# Patient Record
Sex: FEMALE | Race: WHITE
[De-identification: names, ages, dates, MRNs, and addresses within clinical notes are randomized per-mention and may not be internally consistent; named-entity substitution may affect disease eponyms.]

---

## 2019-09-06 ENCOUNTER — HOSPITAL ENCOUNTER (EMERGENCY)
Dept: HOSPITAL 25 - UCCORT | Age: 79
Discharge: HOME | End: 2019-09-06
Payer: MEDICARE

## 2019-09-06 VITALS — SYSTOLIC BLOOD PRESSURE: 129 MMHG | DIASTOLIC BLOOD PRESSURE: 74 MMHG
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
## 2019-09-06 DIAGNOSIS — I10: ICD-10-CM

## 2019-09-06 DIAGNOSIS — E11.9: ICD-10-CM

## 2019-09-06 DIAGNOSIS — L03.115: Primary | ICD-10-CM

## 2019-09-06 DIAGNOSIS — S92.354A: ICD-10-CM
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
## 2019-09-06 PROCEDURE — G0463 HOSPITAL OUTPT CLINIC VISIT: HCPCS

## 2019-09-06 PROCEDURE — 99212 OFFICE O/P EST SF 10 MIN: CPT

## 2019-09-06 NOTE — XMS REPORT
Continuity of Care Document (CCD)

 Created on:2019



Patient:Yumiko Norton

Sex:Female

:1940

External Reference #:[id number]





Demographics







 Address  [address] [place] [address]

 

 Home Phone  [phone]

 

 Email Address  [email or web address]

 

 Preferred Language  en

 

 Marital Status  Not  or 

 

 Quaker Affiliation  Unknown

 

 Race  White

 

 Ethnic Group  Not  or 









Author







 Name  Anselmo Nation MD (transmitted by agent of provider Aman Sousa)

 

 Address  [address] [place] [id number]









Care Team Providers







 Name  Role  Phone

 

 Caden Chandra DPM - Podiatrist  Care Team Information   [phone]
[phone]

 

 Jaime Copeland RPA - Physician  Care Team Information   [phone]
[phone]



 Assistant    









Problems







 Description

 

 No Information Available







Social History







 Type  Date  Description  Comments

 

 Birth Sex    Unknown  

 

 ETOH Use    Denies alcohol use  

 

 Tobacco Use  Start: Unknown  Patient has never smoked  

 

 Recreational Drug Use    Denies Drug Use  

 

 Exercise Type/Frequency    Does not exercise  







Allergies, Adverse Reactions, Alerts







 Description

 

 No Known Drug Allergies







Medications







 Active Medications  SIG  Qnty  Indications  Ordering  Date



         Provider  

 

 Metoprolol Tartrate  1 tab by mouth      Unknown  



             50mg Tablets  once daily        



           

 

 Lantus Solostar  134 units at hs      Unknown  



         100Unit/ML Solution          



 Pen-Inject          

 

 Glyburide  1 tab by mouth      Unknown  



   5mg Tablets  once daily        



           

 

 Lyrica  1 tab by mouth      Unknown  



 50mg Capsules  three times        



   daily        

 

 Simvastatin  take 1 tablet      Unknown  



     10mg Tablets  by mouth at        



   bedtime        

 

 Aspir-81  1 by mouth      Unknown  



  81mg Tablets DR  every day        



           

 

 Enalapril Maleate  1 by mouth      Unknown  



           20mg Tablets  twice daily        



           

 

 Hydrochlorothiazide  2 tabs by mouth      Unknown  



             25mg Tablets  every day        



           

 

 Vitamin D3  1 by mouth      Unknown  



    2000Unit Capsules  every day        



           

 

 Humulin R  as directed      Lashawn Sepulveda,  



   100Unit/ML Solution        MD  



           

 

 Ibuprofen  one tablet by      Unknown  



   600mg Tablets  mouth q6 as        



   needed pain        







Immunizations







 Description

 

 No Information Available







Vital Signs







 Date  Vital  Result  Comment

 

 2019 10:41am  Height  68 inches  5'8"









 Weight  194.00 lb  

 

 Heart Rate  61 /min  

 

 BP Systolic Sitting  162 mmHg  

 

 BP Diastolic Sitting  82 mmHg  

 

 Respiratory Rate  18 /min  

 

 Body Temperature  97.7 F  

 

 Pain Level  10  

 

 O2 % BldC Oximetry  97 %  

 

 BMI (Body Mass Index)  29.5 kg/m2  









 2016  9:30am  Height  68 inches  5'8"









 Weight  204.00 lb  per patient

 

 Heart Rate  64 /min  

 

 BP Systolic Sitting  124 mmHg  

 

 BP Diastolic Sitting  72 mmHg  

 

 O2 % BldC Oximetry  98 %  

 

 BMI (Body Mass Index)  31.0 kg/m2  







Results







 Description

 

 No Information Available







Procedures







 Description

 

 No Information Available







Medical Devices







 Description

 

 No Information Available







Encounters







 Description

 

 No Information Available







Assessments







 Date  Code  Description  Provider

 

 2019  S92.354A  Nondisplaced fracture of fifth metatarsal  Anselmo Nation MD



     bone, right foot, initial encounter for closed  



     fracture  

 

 2019  S93.401A  Sprain of unspecified ligament of right ankle,  Anselmo Nation MD



     initial encounter  







Plan of Treatment

2019 - Anselmo Nation, MDS92.354A Nondisplaced fracture of fifth 
metatarsal bone, right foot, initial encounter for closed fractureComments:
walking bootFollow up:Follow up:   Follow up:   Dr. Chandra- patient's 
podiatrist as gpyyghyplQ58.401A Sprain of unspecified ligament of right ankle, 
initial encounter



Functional Status







 Description

 

 No Information Available







Mental Status







 Description

 

 No Information Available







Referrals







 Description

 

 No Information Available

## 2019-09-06 NOTE — UC
Skin Complaint HPI





- HPI Summary


HPI Summary: 


78-year-old woman comes in with chief complaint of redness to her right lateral 

lower leg.  Patient's been treated for a right fifth metatarsal fracture by Dr. Chandra in podiatry.  On exam today noticed redness and heat just above the 

ankle on the right side.  No fevers no chills patient feels well otherwise.





- History of Current Complaint


Chief Complaint: UCLowerExtremity


Time Seen by Provider: 09/06/19 16:30


Stated Complaint: RIGHT FOOT COMPLAINT


Pain Intensity: 10





- Allergy/Home Medications


Allergies/Adverse Reactions: 


 Allergies











Allergy/AdvReac Type Severity Reaction Status Date / Time


 


No Known Allergies Allergy   Verified 09/06/19 16:19











Home Medications: 


 Home Medications





Cholecalciferol (Vitamin D3) [Vitamin D3] 2,000 unit PO DAILY 09/06/19 [History 

Confirmed 09/06/19]


Hydrochlorothiazide TAB* [Hydrodiuril TAB*] 25 mg PO DAILY 09/06/19 [History 

Confirmed 09/06/19]











PMH/Surg Hx/FS Hx/Imm Hx


Previously Healthy: Yes


Endocrine History: Diabetes, Dyslipidemia


Cardiovascular History: Hypertension





- Surgical History


Surgical History: None





- Family History


Known Family History: Positive: Non-Contributory





- Social History


Alcohol Use: None


Substance Use Type: None


Smoking Status (MU): Never Smoked Tobacco





Review of Systems


All Other Systems Reviewed And Are Negative: Yes


Constitutional: Positive: Negative


Skin: Positive: Other - SEE HPI


Eyes: Positive: Negative


ENT: Positive: Negative


Respiratory: Positive: Negative


Cardiovascular: Positive: Negative


Gastrointestinal: Positive: Negative


Motor: Positive: Negative


Neurovascular: Positive: Negative


Musculoskeletal: Positive: Other: - SEE HPI


Neurological: Positive: Negative


Psychological: Positive: Negative


Is Patient Immunocompromised?: No





Physical Exam


Triage Information Reviewed: Yes


Appearance: Well-Appearing, No Pain Distress, Well-Nourished


Vital Signs: 


 Initial Vital Signs











Temp  98.1 F   09/06/19 16:11


 


Pulse  63   09/06/19 16:11


 


Resp  18   09/06/19 16:11


 


BP  129/74   09/06/19 16:11


 


Pulse Ox  97   09/06/19 16:11











Vital Signs Reviewed: Yes


Eye Exam: Normal


Eyes: Positive: Conjunctiva Clear


Neck: Positive: Supple


Respiratory: Positive: No respiratory distress


Musculoskeletal: Positive: Strength Intact


Neurological: Positive: Alert


Psychological: Positive: Normal Response To Family, Age Appropriate Behavior


Skin: Positive: Other - 4 cm diameter area of erythema on the right lower 

lateral leg just above the ankle.  It's mildly warm to touch there is no 

streaking.  No obvious skin break.  The calf is nontender to palpation and not 

swollen.





Course/Dx





- Course


Course Of Treatment: 


Go to treat for cellulitis in the right lower leg with Keflex patient is to get 

reevaluated sooner if worse or not improving.  No evidence of DVT today on 

examination.





- Diagnoses


Provider Diagnosis: 


 Cellulitis of right leg








Discharge ED





- Sign-Out/Discharge


Documenting (check all that apply): Patient Departure


All imaging exams completed and their final reports reviewed: No Studies





- Discharge Plan


Condition: Stable


Disposition: HOME


Prescriptions: 


Cephalexin CAP* [Keflex CAP*] 500 mg PO QID #40 cap


Patient Education Materials:  Cellulitis (ED)


Referrals: 


IRENA Copeland PA [Primary Care Provider] - 


Caden Chandra DPM [Doctor of Podiatric Medicine] - 


Additional Instructions: 


Follow-up with your doctor within the week to ensure resolution of your 

cellulitis.


Go to the emergency department if the cellulitis worsens with spread of 

infection fevers she feel ill or any other worsening of symptoms.





- Billing Disposition and Condition


Condition: STABLE


Disposition: Home